# Patient Record
Sex: FEMALE | Race: ASIAN | NOT HISPANIC OR LATINO | URBAN - METROPOLITAN AREA
[De-identification: names, ages, dates, MRNs, and addresses within clinical notes are randomized per-mention and may not be internally consistent; named-entity substitution may affect disease eponyms.]

---

## 2018-01-18 ENCOUNTER — OFFICE VISIT (OUTPATIENT)
Dept: FAMILY MEDICINE CLINIC | Facility: CLINIC | Age: 38
End: 2018-01-18

## 2018-01-18 VITALS
DIASTOLIC BLOOD PRESSURE: 70 MMHG | BODY MASS INDEX: 26.8 KG/M2 | WEIGHT: 157 LBS | HEART RATE: 92 BPM | SYSTOLIC BLOOD PRESSURE: 98 MMHG | OXYGEN SATURATION: 99 % | HEIGHT: 64 IN

## 2018-01-18 DIAGNOSIS — R53.1 WEAKNESS: ICD-10-CM

## 2018-01-18 DIAGNOSIS — Z00.00 PHYSICAL EXAM: Primary | ICD-10-CM

## 2018-01-18 DIAGNOSIS — I49.9 IRREGULAR HEART RATE: ICD-10-CM

## 2018-01-18 LAB
ALBUMIN SERPL-MCNC: 4.3 G/DL (ref 3.5–5.2)
ALBUMIN/GLOB SERPL: 1.4 G/DL
ALP SERPL-CCNC: 39 U/L (ref 39–117)
ALT SERPL-CCNC: 16 U/L (ref 1–33)
AST SERPL-CCNC: 18 U/L (ref 1–32)
BASOPHILS # BLD AUTO: 0.02 10*3/MM3 (ref 0–0.2)
BASOPHILS NFR BLD AUTO: 0.3 % (ref 0–1.5)
BILIRUB SERPL-MCNC: 0.5 MG/DL (ref 0.1–1.2)
BUN SERPL-MCNC: 10 MG/DL (ref 6–20)
BUN/CREAT SERPL: 15.6 (ref 7–25)
CALCIUM SERPL-MCNC: 9.5 MG/DL (ref 8.6–10.5)
CHLORIDE SERPL-SCNC: 98 MMOL/L (ref 98–107)
CHOLEST SERPL-MCNC: 132 MG/DL (ref 0–200)
CHOLEST/HDLC SERPL: 2.44 {RATIO}
CK SERPL-CCNC: 51 U/L (ref 20–180)
CO2 SERPL-SCNC: 22.2 MMOL/L (ref 22–29)
CREAT SERPL-MCNC: 0.64 MG/DL (ref 0.57–1)
EOSINOPHIL # BLD AUTO: 0.38 10*3/MM3 (ref 0–0.7)
EOSINOPHIL NFR BLD AUTO: 5.9 % (ref 0.3–6.2)
ERYTHROCYTE [DISTWIDTH] IN BLOOD BY AUTOMATED COUNT: 12.7 % (ref 11.7–13)
FERRITIN SERPL-MCNC: 118.8 NG/ML (ref 13–150)
FOLATE SERPL-MCNC: 17.59 NG/ML (ref 4.78–24.2)
GLOBULIN SER CALC-MCNC: 3.1 GM/DL
GLUCOSE SERPL-MCNC: 96 MG/DL (ref 65–99)
HCT VFR BLD AUTO: 39.5 % (ref 35.6–45.5)
HDLC SERPL-MCNC: 54 MG/DL (ref 40–60)
HGB BLD-MCNC: 12.9 G/DL (ref 11.9–15.5)
IMM GRANULOCYTES # BLD: 0 10*3/MM3 (ref 0–0.03)
IMM GRANULOCYTES NFR BLD: 0 % (ref 0–0.5)
IRON SERPL-MCNC: 140 MCG/DL (ref 37–145)
LDLC SERPL CALC-MCNC: 68 MG/DL (ref 0–100)
LYMPHOCYTES # BLD AUTO: 1.7 10*3/MM3 (ref 0.9–4.8)
LYMPHOCYTES NFR BLD AUTO: 26.4 % (ref 19.6–45.3)
MCH RBC QN AUTO: 29.3 PG (ref 26.9–32)
MCHC RBC AUTO-ENTMCNC: 32.7 G/DL (ref 32.4–36.3)
MCV RBC AUTO: 89.8 FL (ref 80.5–98.2)
MONOCYTES # BLD AUTO: 0.4 10*3/MM3 (ref 0.2–1.2)
MONOCYTES NFR BLD AUTO: 6.2 % (ref 5–12)
NEUTROPHILS # BLD AUTO: 3.93 10*3/MM3 (ref 1.9–8.1)
NEUTROPHILS NFR BLD AUTO: 61.2 % (ref 42.7–76)
PLATELET # BLD AUTO: 237 10*3/MM3 (ref 140–500)
POTASSIUM SERPL-SCNC: 4 MMOL/L (ref 3.5–5.2)
PROT SERPL-MCNC: 7.4 G/DL (ref 6–8.5)
RBC # BLD AUTO: 4.4 10*6/MM3 (ref 3.9–5.2)
SODIUM SERPL-SCNC: 135 MMOL/L (ref 136–145)
TRIGL SERPL-MCNC: 52 MG/DL (ref 0–150)
VIT B12 SERPL-MCNC: 747 PG/ML (ref 211–946)
VLDLC SERPL CALC-MCNC: 10.4 MG/DL (ref 5–40)
WBC # BLD AUTO: 6.43 10*3/MM3 (ref 4.5–10.7)

## 2018-01-18 PROCEDURE — 93000 ELECTROCARDIOGRAM COMPLETE: CPT | Performed by: NURSE PRACTITIONER

## 2018-01-18 PROCEDURE — 99202 OFFICE O/P NEW SF 15 MIN: CPT | Performed by: NURSE PRACTITIONER

## 2018-01-18 PROCEDURE — 99385 PREV VISIT NEW AGE 18-39: CPT | Performed by: NURSE PRACTITIONER

## 2018-01-18 NOTE — PATIENT INSTRUCTIONS
Preventive Care 18-39 Years, Female  Preventive care refers to lifestyle choices and visits with your health care provider that can promote health and wellness.  What does preventive care include?  · A yearly physical exam. This is also called an annual well check.  · Dental exams once or twice a year.  · Routine eye exams. Ask your health care provider how often you should have your eyes checked.  · Personal lifestyle choices, including:  ¨ Daily care of your teeth and gums.  ¨ Regular physical activity.  ¨ Eating a healthy diet.  ¨ Avoiding tobacco and drug use.  ¨ Limiting alcohol use.  ¨ Practicing safe sex.  ¨ Taking vitamin and mineral supplements as recommended by your health care provider.  What happens during an annual well check?  The services and screenings done by your health care provider during your annual well check will depend on your age, overall health, lifestyle risk factors, and family history of disease.  Counseling   Your health care provider may ask you questions about your:  · Alcohol use.  · Tobacco use.  · Drug use.  · Emotional well-being.  · Home and relationship well-being.  · Sexual activity.  · Eating habits.  · Work and work environment.  · Method of birth control.  · Menstrual cycle.  · Pregnancy history.  Screening   You may have the following tests or measurements:  · Height, weight, and BMI.  · Diabetes screening. This is done by checking your blood sugar (glucose) after you have not eaten for a while (fasting).  · Blood pressure.  · Lipid and cholesterol levels. These may be checked every 5 years starting at age 20.  · Skin check.  · Hepatitis C blood test.  · Hepatitis B blood test.  · Sexually transmitted disease (STD) testing.  · BRCA-related cancer screening. This may be done if you have a family history of breast, ovarian, tubal, or peritoneal cancers.  · Pelvic exam and Pap test. This may be done every 3 years starting at age 21. Starting at age 30, this may be done every 5  years if you have a Pap test in combination with an HPV test.  Discuss your test results, treatment options, and if necessary, the need for more tests with your health care provider.  Vaccines   Your health care provider may recommend certain vaccines, such as:  · Influenza vaccine. This is recommended every year.  · Tetanus, diphtheria, and acellular pertussis (Tdap, Td) vaccine. You may need a Td booster every 10 years.  · Varicella vaccine. You may need this if you have not been vaccinated.  · HPV vaccine. If you are 26 or younger, you may need three doses over 6 months.  · Measles, mumps, and rubella (MMR) vaccine. You may need at least one dose of MMR. You may also need a second dose.  · Pneumococcal 13-valent conjugate (PCV13) vaccine. You may need this if you have certain conditions and were not previously vaccinated.  · Pneumococcal polysaccharide (PPSV23) vaccine. You may need one or two doses if you smoke cigarettes or if you have certain conditions.  · Meningococcal vaccine. One dose is recommended if you are age 19-21 years and a first-year college student living in a residence espinosa, or if you have one of several medical conditions. You may also need additional booster doses.  · Hepatitis A vaccine. You may need this if you have certain conditions or if you travel or work in places where you may be exposed to hepatitis A.  · Hepatitis B vaccine. You may need this if you have certain conditions or if you travel or work in places where you may be exposed to hepatitis B.  · Haemophilus influenzae type b (Hib) vaccine. You may need this if you have certain risk factors.  Talk to your health care provider about which screenings and vaccines you need and how often you need them.  This information is not intended to replace advice given to you by your health care provider. Make sure you discuss any questions you have with your health care provider.  Document Released: 02/13/2003 Document Revised: 09/06/2017  Document Reviewed: 10/18/2016  [employer] Interactive Patient Education © 2017 [employer]

## 2018-01-18 NOTE — PROGRESS NOTES
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 She states that on 12/26/17 and 1/2/18 she had weakness. From 12/26/17-1/2/18 she had rapid irregular heart beat and chest discomfort. Was not short of breath.Did not have any orthopnea.    PMHX:None  PFHX:HTN both parents  Diabetes both parents, live in [place]  Not  no children  Vegan diet  Has started to exercise since her incident with her heart.       Assessment/Plan   Problem List Items Addressed This Visit     None      Visit Diagnoses     Physical exam    -  Primary    Relevant Orders    Comprehensive Metabolic Panel    Lipid Panel With / Chol / HDL Ratio    Irregular heart rate        Relevant Orders    ECG 12 Lead    Troponin I    CK    Weakness        Relevant Orders    Iron    Vitamin B12 and Folate    Ferritin    CBC and Differential             No Follow-up on file.  There are no Patient Instructions on file for this visit.    Chief Complaint   Patient presents with   • Annual Exam   • Establish Care   • Rapid Heart Rate     Social History   Substance Use Topics   • Smoking status: Never Smoker   • Smokeless tobacco: Never Used   • Alcohol use No       History of Present Illness     The following portions of the patient's history were reviewed and updated as appropriate:PMHroutine: Social history , Past Medical History, Surgical history , Allergies, Current Medications, Active Problem List, Family History and Health Maintenance    Review of Systems   Constitutional: Positive for fatigue. Negative for activity change and appetite change.   Respiratory: Negative for cough, chest tightness and shortness of breath.    Cardiovascular: Negative for chest pain.       Objective   Vitals:    01/18/18 0812   BP: 98/70   Pulse: 92   SpO2: 99%   Weight: 71.2 kg (157 lb)   Height: 162.6 cm (64\")     Body mass index is 26.95 kg/(m^2).  Physical Exam   Constitutional: She is oriented to person, place, and time. She appears well-developed and " well-nourished. No distress.   HENT:   Head: Normocephalic.   Right Ear: External ear normal.   Left Ear: External ear normal.   Eyes: EOM are normal. Pupils are equal, round, and reactive to light.   Cardiovascular: Normal rate and regular rhythm.    Pulmonary/Chest: Effort normal.   Abdominal: Soft. Bowel sounds are normal.   Musculoskeletal: Normal range of motion.   Neurological: She is alert and oriented to person, place, and time.   Skin: Skin is warm.   Nursing note and vitals reviewed.      Reviewed Data:  No results found for any previous visit.      ECG 12 Lead  Date/Time: 1/18/2018 10:10 AM  Performed by: SHERLYN SMITH  Authorized by: SHERLYN SMITH   Comparison: not compared with previous ECG   Rhythm: sinus rhythm  Rate: normal  QRS axis: normal          left atrial enlargement  Rt ventricular conduction delay  Will refer to cardiology for evaluation

## 2018-01-19 LAB — TROPONIN I SERPL-MCNC: <0.01 NG/ML (ref 0–0.04)

## 2018-05-01 ENCOUNTER — OFFICE VISIT (OUTPATIENT)
Dept: CARDIOLOGY | Facility: CLINIC | Age: 38
End: 2018-05-01

## 2018-05-01 VITALS
SYSTOLIC BLOOD PRESSURE: 120 MMHG | WEIGHT: 150 LBS | DIASTOLIC BLOOD PRESSURE: 78 MMHG | HEART RATE: 101 BPM | HEIGHT: 64 IN | BODY MASS INDEX: 25.61 KG/M2

## 2018-05-01 DIAGNOSIS — R00.2 PALPITATIONS: Primary | ICD-10-CM

## 2018-05-01 DIAGNOSIS — R06.02 SHORTNESS OF BREATH: ICD-10-CM

## 2018-05-01 PROCEDURE — 93000 ELECTROCARDIOGRAM COMPLETE: CPT | Performed by: INTERNAL MEDICINE

## 2018-05-01 PROCEDURE — 99203 OFFICE O/P NEW LOW 30 MIN: CPT | Performed by: INTERNAL MEDICINE

## 2018-05-01 NOTE — PROGRESS NOTES
Date of Office Visit: 2018  Encounter Provider: Almita Graham MD  Place of Service: [facility] CARDIOLOGY  Patient Name: Gabriela Be  :1980      Patient ID:  Gabriela Be is a 37 y.o. female is here for dyspnea, palpitations.           History of Present Illness    Miss Ibarra is a 37-year-old female whose mother lives with her.  She is coming in with palpitations.  This started around Maeve time .  Been intermittent.  She's had mild dizziness associated with them but no dyspnea or syncope.  She has occasional dyspnea but only with heavy exertion.  Sometimes when she's sitting still, she feels like she is to take a deep breath.  She's had no tachycardia.  She denies exertional chest tightness or pressure.  Sometimes she gets chest pain with stress.  Comment.  She has no history of diabetes mellitus or hypertension.  She had laboratory values done 2018 showing LDL 68, HR 54 normal CMP and CBC.    She's never had myocardial infarction, stroke, rheumatic fever, heart failure, sleep apnea, blood clots, cancer, renal disease, asthma, emphysema.    She's had no recent travel no change medications.  When she gets a pounding, she does get brain fog and weakness.  She has no history of hypotension.    She is single and has no children.  She works as a  at [employer] 40 hours per week.  She has rare alcohol and no cigarettes.  She has 2 cups of coffee per day.  Her mother is 71 has anginal chest pain.      Past Medical History:   Diagnosis Date   • Irregular heartbeat    • Weakness          Past Surgical History:   Procedure Laterality Date   • HEMORRHOIDECTOMY         Current Outpatient Prescriptions on File Prior to Visit   Medication Sig Dispense Refill   • Magnesium Gluconate (MAGNESIUM 27 PO) Take  by mouth.     • NON FORMULARY nootropic     • Omega-3 Fatty Acids (OMEGA 3 PO) Take  by mouth.     • Potassium (POTASSIMIN PO) Take  by mouth.    "    No current facility-administered medications on file prior to visit.        Social History     Social History   • Marital status: Single     Spouse name: N/A   • Number of children: N/A   • Years of education: N/A     Occupational History   • Not on file.     Social History Main Topics   • Smoking status: Never Smoker   • Smokeless tobacco: Never Used      Comment: CAFFEINE USE   • Alcohol use Yes      Comment: RARE   • Drug use: No   • Sexual activity: Not on file     Other Topics Concern   • Not on file     Social History Narrative   • No narrative on file           Review of Systems   Constitution: Negative.   HENT: Negative for congestion.    Eyes: Negative for vision loss in left eye and vision loss in right eye.   Respiratory: Negative.  Negative for cough, hemoptysis, shortness of breath, sleep disturbances due to breathing, snoring, sputum production and wheezing.    Endocrine: Negative.    Hematologic/Lymphatic: Negative.    Skin: Negative for poor wound healing and rash.   Musculoskeletal: Negative for falls, gout, muscle cramps and myalgias.   Gastrointestinal: Negative for abdominal pain, diarrhea, dysphagia, hematemesis, melena, nausea and vomiting.   Neurological: Negative for excessive daytime sleepiness, dizziness, headaches, light-headedness, loss of balance, seizures and vertigo.   Psychiatric/Behavioral: Negative for depression and substance abuse. The patient is not nervous/anxious.        Procedures    ECG 12 Lead  Date/Time: 5/1/2018 12:57 PM  Performed by: LAYLA HANNAH  Authorized by: LAYLA HANNAH   Comparison: compared with previous ECG   Similar to previous ECG  Rhythm: sinus rhythm  Clinical impression: normal ECG                Objective:      Vitals:    05/01/18 1227 05/01/18 1229   BP: 120/80 120/78   BP Location: Right arm Left arm   Patient Position: Sitting Sitting   Pulse: 101    Weight: 68 kg (150 lb)    Height: 162.6 cm (64\")      Body mass index is 25.75 " kg/m².    Physical Exam   Constitutional: She is oriented to person, place, and time. She appears well-developed and well-nourished. No distress.   HENT:   Head: Normocephalic and atraumatic.   Eyes: Conjunctivae are normal. No scleral icterus.   Neck: Neck supple. No JVD present. Carotid bruit is not present. No thyromegaly present.   Cardiovascular: Normal rate, regular rhythm, S1 normal, S2 normal, normal heart sounds and intact distal pulses.   No extrasystoles are present. PMI is not displaced.  Exam reveals no gallop.    No murmur heard.  Pulses:       Carotid pulses are 2+ on the right side, and 2+ on the left side.       Radial pulses are 2+ on the right side, and 2+ on the left side.        Dorsalis pedis pulses are 2+ on the right side, and 2+ on the left side.        Posterior tibial pulses are 2+ on the right side, and 2+ on the left side.   Pulmonary/Chest: Effort normal and breath sounds normal. No respiratory distress. She has no wheezes. She has no rhonchi. She has no rales. She exhibits no tenderness.   Abdominal: Soft. Bowel sounds are normal. She exhibits no distension, no abdominal bruit and no mass. There is no tenderness.   Musculoskeletal: She exhibits no edema or deformity.   Lymphadenopathy:     She has no cervical adenopathy.   Neurological: She is alert and oriented to person, place, and time. No cranial nerve deficit.   Skin: Skin is warm and dry. No rash noted. She is not diaphoretic. No cyanosis. No pallor. Nails show no clubbing.   Psychiatric: She has a normal mood and affect. Judgment normal.   Vitals reviewed.      Lab Review:       Assessment:      Diagnosis Plan   1. Palpitations  TSH    Holter Monitor - 24 Hour   2. Shortness of breath       1. Palpitations, set up holter and check TSH.  No med changes.      Plan:       F/u with depend on holter.

## 2018-05-03 ENCOUNTER — TELEPHONE (OUTPATIENT)
Dept: CARDIOLOGY | Facility: CLINIC | Age: 38
End: 2018-05-03

## 2018-05-03 NOTE — TELEPHONE ENCOUNTER
----- Message from Almita Graham MD sent at 5/3/2018  2:03 PM EDT -----  pls call and let her know that her monitor is normal  - no f/u needed unless has more palpitations.

## 2018-11-28 ENCOUNTER — OFFICE VISIT (OUTPATIENT)
Dept: FAMILY MEDICINE CLINIC | Facility: CLINIC | Age: 38
End: 2018-11-28

## 2018-11-28 VITALS
BODY MASS INDEX: 28.68 KG/M2 | HEIGHT: 64 IN | HEART RATE: 88 BPM | WEIGHT: 168 LBS | SYSTOLIC BLOOD PRESSURE: 100 MMHG | OXYGEN SATURATION: 100 % | DIASTOLIC BLOOD PRESSURE: 70 MMHG

## 2018-11-28 DIAGNOSIS — R00.2 INTERMITTENT PALPITATIONS: Primary | ICD-10-CM

## 2018-11-28 PROCEDURE — 99213 OFFICE O/P EST LOW 20 MIN: CPT | Performed by: NURSE PRACTITIONER

## 2018-11-28 NOTE — PROGRESS NOTES
"Gabriela Be is a 38 y.o. female.Gabriela states that for the last 2 months she has had a thump in her chest followed by a cough. She was seen by a cardiologist early this year and they wanted her to have thyroid testing.She got lost going to the lab and never had the test done  Reports she feels like her heart beat in her stomach.     Is a     Assessment/Plan   Problem List Items Addressed This Visit     None      Visit Diagnoses     Intermittent palpitations    -  Primary    Relevant Orders    TSH             No Follow-up on file.  There are no Patient Instructions on file for this visit.    Chief Complaint   Patient presents with   • Cough     Social History     Tobacco Use   • Smoking status: Never Smoker   • Smokeless tobacco: Never Used   • Tobacco comment: CAFFEINE USE   Substance Use Topics   • Alcohol use: Yes     Comment: RARE   • Drug use: No       History of Present Illness     The following portions of the patient's history were reviewed and updated as appropriate:PMHroutine: Social history , Allergies, Current Medications, Active Problem List and Health Maintenance    Review of Systems   Constitutional: Negative for activity change.   Cardiovascular: Negative for chest pain and palpitations.   Gastrointestinal: Negative for abdominal distention and abdominal pain.       Objective   Vitals:    11/28/18 0837   BP: 100/70   Pulse: 88   SpO2: 100%   Weight: 76.2 kg (168 lb)   Height: 162.6 cm (64\")     Body mass index is 28.84 kg/m².  Physical Exam   Constitutional: She appears well-developed and well-nourished. No distress.   HENT:   Head: Normocephalic and atraumatic.   Eyes: EOM are normal.   Neck: Neck supple. No thyromegaly present.   Cardiovascular: Normal rate, regular rhythm and normal heart sounds.   Pulmonary/Chest: Effort normal and breath sounds normal.   Musculoskeletal: Normal range of motion.   Neurological: She is alert.   Skin: Skin is warm.   Nursing note and vitals " reviewed.    Reviewed Data:  No visits with results within 1 Month(s) from this visit.   Latest known visit with results is:   Office Visit on 01/18/2018   Component Date Value Ref Range Status   • Glucose 01/18/2018 96  65 - 99 mg/dL Final   • BUN 01/18/2018 10  6 - 20 mg/dL Final   • Creatinine 01/18/2018 0.64  0.57 - 1.00 mg/dL Final   • eGFR Non  Am 01/18/2018 103  >60 mL/min/1.73 Final   • eGFR African Am 01/18/2018 125  >60 mL/min/1.73 Final   • BUN/Creatinine Ratio 01/18/2018 15.6  7.0 - 25.0 Final   • Sodium 01/18/2018 135* 136 - 145 mmol/L Final   • Potassium 01/18/2018 4.0  3.5 - 5.2 mmol/L Final   • Chloride 01/18/2018 98  98 - 107 mmol/L Final   • Total CO2 01/18/2018 22.2  22.0 - 29.0 mmol/L Final   • Calcium 01/18/2018 9.5  8.6 - 10.5 mg/dL Final   • Total Protein 01/18/2018 7.4  6.0 - 8.5 g/dL Final   • Albumin 01/18/2018 4.30  3.50 - 5.20 g/dL Final   • Globulin 01/18/2018 3.1  gm/dL Final   • A/G Ratio 01/18/2018 1.4  g/dL Final   • Total Bilirubin 01/18/2018 0.5  0.1 - 1.2 mg/dL Final   • Alkaline Phosphatase 01/18/2018 39  39 - 117 U/L Final   • AST (SGOT) 01/18/2018 18  1 - 32 U/L Final   • ALT (SGPT) 01/18/2018 16  1 - 33 U/L Final   • Total Cholesterol 01/18/2018 132  0 - 200 mg/dL Final   • Triglycerides 01/18/2018 52  0 - 150 mg/dL Final   • HDL Cholesterol 01/18/2018 54  40 - 60 mg/dL Final   • VLDL Cholesterol 01/18/2018 10.4  5 - 40 mg/dL Final   • LDL Cholesterol  01/18/2018 68  0 - 100 mg/dL Final   • Chol/HDL Ratio 01/18/2018 2.44   Final   • Troponin I 01/18/2018 <0.01  0.00 - 0.04 ng/mL Final   • Creatine Kinase 01/18/2018 51  20 - 180 U/L Final   • WBC 01/18/2018 6.43  4.50 - 10.70 10*3/mm3 Final   • RBC 01/18/2018 4.40  3.90 - 5.20 10*6/mm3 Final   • Hemoglobin 01/18/2018 12.9  11.9 - 15.5 g/dL Final   • Hematocrit 01/18/2018 39.5  35.6 - 45.5 % Final   • MCV 01/18/2018 89.8  80.5 - 98.2 fL Final   • MCH 01/18/2018 29.3  26.9 - 32.0 pg Final   • MCHC 01/18/2018 32.7  32.4 -  36.3 g/dL Final   • RDW 01/18/2018 12.7  11.7 - 13.0 % Final   • Platelets 01/18/2018 237  140 - 500 10*3/mm3 Final   • Neutrophil Rel % 01/18/2018 61.2  42.7 - 76.0 % Final   • Lymphocyte Rel % 01/18/2018 26.4  19.6 - 45.3 % Final   • Monocyte Rel % 01/18/2018 6.2  5.0 - 12.0 % Final   • Eosinophil Rel % 01/18/2018 5.9  0.3 - 6.2 % Final   • Basophil Rel % 01/18/2018 0.3  0.0 - 1.5 % Final   • Neutrophils Absolute 01/18/2018 3.93  1.90 - 8.10 10*3/mm3 Final   • Lymphocytes Absolute 01/18/2018 1.70  0.90 - 4.80 10*3/mm3 Final   • Monocytes Absolute 01/18/2018 0.40  0.20 - 1.20 10*3/mm3 Final   • Eosinophils Absolute 01/18/2018 0.38  0.00 - 0.70 10*3/mm3 Final   • Basophils Absolute 01/18/2018 0.02  0.00 - 0.20 10*3/mm3 Final   • Immature Granulocyte Rel % 01/18/2018 0.0  0.0 - 0.5 % Final   • Immature Grans Absolute 01/18/2018 0.00  0.00 - 0.03 10*3/mm3 Final   • Vitamin B-12 01/18/2018 747  211 - 946 pg/mL Final   • Folate 01/18/2018 17.59  4.78 - 24.20 ng/mL Final   • Iron 01/18/2018 140  37 - 145 mcg/dL Final   • Ferritin 01/18/2018 118.80  13.00 - 150.00 ng/mL Final

## 2018-11-29 LAB — TSH SERPL DL<=0.005 MIU/L-ACNC: 2.38 MIU/ML (ref 0.27–4.2)

## 2020-02-18 ENCOUNTER — TELEPHONE (OUTPATIENT)
Dept: FAMILY MEDICINE CLINIC | Facility: CLINIC | Age: 40
End: 2020-02-18

## 2020-02-18 NOTE — TELEPHONE ENCOUNTER
PT said that she had her labs done at [facility] and her thyroid was low. She wants to know if she should come in and be seen in the office or if she needs to see a specialist?

## 2020-02-19 NOTE — TELEPHONE ENCOUNTER
Yes I would like her to come in in I cannot find her results anywhere she could bring them with her we could try to find one that would be great thank you

## 2020-02-27 ENCOUNTER — OFFICE VISIT (OUTPATIENT)
Dept: FAMILY MEDICINE CLINIC | Facility: CLINIC | Age: 40
End: 2020-02-27

## 2020-02-27 VITALS
WEIGHT: 168 LBS | HEART RATE: 94 BPM | HEIGHT: 64 IN | DIASTOLIC BLOOD PRESSURE: 84 MMHG | BODY MASS INDEX: 28.68 KG/M2 | RESPIRATION RATE: 16 BRPM | SYSTOLIC BLOOD PRESSURE: 138 MMHG | TEMPERATURE: 97.7 F | OXYGEN SATURATION: 98 %

## 2020-02-27 DIAGNOSIS — R53.83 FATIGUE, UNSPECIFIED TYPE: ICD-10-CM

## 2020-02-27 LAB
ALBUMIN SERPL-MCNC: 4.3 G/DL (ref 3.5–5.2)
ALBUMIN/GLOB SERPL: 1.4 G/DL
ALP SERPL-CCNC: 94 U/L (ref 39–117)
ALT SERPL-CCNC: 39 U/L (ref 1–33)
AST SERPL-CCNC: 20 U/L (ref 1–32)
BILIRUB SERPL-MCNC: 0.3 MG/DL (ref 0.2–1.2)
BUN SERPL-MCNC: 13 MG/DL (ref 6–20)
BUN/CREAT SERPL: 25 (ref 7–25)
CALCIUM SERPL-MCNC: 9.8 MG/DL (ref 8.6–10.5)
CHLORIDE SERPL-SCNC: 101 MMOL/L (ref 98–107)
CO2 SERPL-SCNC: 23 MMOL/L (ref 22–29)
CREAT SERPL-MCNC: 0.52 MG/DL (ref 0.57–1)
ERYTHROCYTE [DISTWIDTH] IN BLOOD BY AUTOMATED COUNT: 12 % (ref 12.3–15.4)
GLOBULIN SER CALC-MCNC: 3 GM/DL
GLUCOSE SERPL-MCNC: 120 MG/DL (ref 65–99)
HCT VFR BLD AUTO: 40.1 % (ref 34–46.6)
HGB BLD-MCNC: 13.3 G/DL (ref 12–15.9)
MCH RBC QN AUTO: 27.8 PG (ref 26.6–33)
MCHC RBC AUTO-ENTMCNC: 33.2 G/DL (ref 31.5–35.7)
MCV RBC AUTO: 83.9 FL (ref 79–97)
PLATELET # BLD AUTO: 283 10*3/MM3 (ref 140–450)
POTASSIUM SERPL-SCNC: 4.6 MMOL/L (ref 3.5–5.2)
PROT SERPL-MCNC: 7.3 G/DL (ref 6–8.5)
RBC # BLD AUTO: 4.78 10*6/MM3 (ref 3.77–5.28)
SODIUM SERPL-SCNC: 136 MMOL/L (ref 136–145)
T4 FREE SERPL-MCNC: 3.27 NG/DL (ref 0.93–1.7)
TSH SERPL DL<=0.005 MIU/L-ACNC: <0.005 UIU/ML (ref 0.27–4.2)
WBC # BLD AUTO: 7.54 10*3/MM3 (ref 3.4–10.8)

## 2020-02-27 PROCEDURE — 99213 OFFICE O/P EST LOW 20 MIN: CPT | Performed by: NURSE PRACTITIONER

## 2020-02-27 NOTE — PATIENT INSTRUCTIONS
Hypothyroidism    Hypothyroidism is when the thyroid gland does not make enough of certain hormones (it is underactive). The thyroid gland is a small gland located in the lower front part of the neck, just in front of the windpipe (trachea). This gland makes hormones that help control how the body uses food for energy (metabolism) as well as how the heart and brain function. These hormones also play a role in keeping your bones strong. When the thyroid is underactive, it produces too little of the hormones thyroxine (T4) and triiodothyronine (T3).  What are the causes?  This condition may be caused by:  · Hashimoto's disease. This is a disease in which the body's disease-fighting system (immune system) attacks the thyroid gland. This is the most common cause.  · Viral infections.  · Pregnancy.  · Certain medicines.  · Birth defects.  · Past radiation treatments to the head or neck for cancer.  · Past treatment with radioactive iodine.  · Past exposure to radiation in the environment.  · Past surgical removal of part or all of the thyroid.  · Problems with a gland in the center of the brain (pituitary gland).  · Lack of enough iodine in the diet.  What increases the risk?  You are more likely to develop this condition if:  · You are female.  · You have a family history of thyroid conditions.  · You use a medicine called lithium.  · You take medicines that affect the immune system (immunosuppressants).  What are the signs or symptoms?  Symptoms of this condition include:  · Feeling as though you have no energy (lethargy).  · Not being able to tolerate cold.  · Weight gain that is not explained by a change in diet or exercise habits.  · Lack of appetite.  · Dry skin.  · Coarse hair.  · Menstrual irregularity.  · Slowing of thought processes.  · Constipation.  · Sadness or depression.  How is this diagnosed?  This condition may be diagnosed based on:  · Your symptoms, your medical history, and a physical exam.  · Blood  tests.  You may also have imaging tests, such as an ultrasound or MRI.  How is this treated?  This condition is treated with medicine that replaces the thyroid hormones that your body does not make. After you begin treatment, it may take several weeks for symptoms to go away.  Follow these instructions at home:  · Take over-the-counter and prescription medicines only as told by your health care provider.  · If you start taking any new medicines, tell your health care provider.  · Keep all follow-up visits as told by your health care provider. This is important.  ? As your condition improves, your dosage of thyroid hormone medicine may change.  ? You will need to have blood tests regularly so that your health care provider can monitor your condition.  Contact a health care provider if:  · Your symptoms do not get better with treatment.  · You are taking thyroid replacement medicine and you:  ? Sweat a lot.  ? Have tremors.  ? Feel anxious.  ? Lose weight rapidly.  ? Cannot tolerate heat.  ? Have emotional swings.  ? Have diarrhea.  ? Feel weak.  Get help right away if you have:  · Chest pain.  · An irregular heartbeat.  · A rapid heartbeat.  · Difficulty breathing.  Summary  · Hypothyroidism is when the thyroid gland does not make enough of certain hormones (it is underactive).  · When the thyroid is underactive, it produces too little of the hormones thyroxine (T4) and triiodothyronine (T3).  · The most common cause is Hashimoto's disease, a disease in which the body's disease-fighting system (immune system) attacks the thyroid gland. The condition can also be caused by viral infections, medicine, pregnancy, or past radiation treatment to the head or neck.  · Symptoms may include weight gain, dry skin, constipation, feeling as though you do not have energy, and not being able to tolerate cold.  · This condition is treated with medicine to replace the thyroid hormones that your body does not make.  This information  is not intended to replace advice given to you by your health care provider. Make sure you discuss any questions you have with your health care provider.  Document Released: 12/18/2006 Document Revised: 11/28/2018 Document Reviewed: 11/28/2018  [employer] Interactive Patient Education © 2020 [employer]

## 2020-02-27 NOTE — PROGRESS NOTES
Subjective abnormal lab result  Gabriela Be is a 39 y.o. female.     History of Present Illness   Gabriela is here for tsh lab work. She complaints of being fatigue and having palpitations.   She went to the [facility] and had her TSH drawn and it was high. She would like to have more labwork.    The following portions of the patient's history were reviewed and updated as appropriate: allergies, current medications, past family history, past medical history, past social history, past surgical history and problem list.    Review of Systems   Constitutional: Positive for fatigue and unexpected weight gain.   Cardiovascular: Positive for palpitations.   Neurological: Positive for weakness.       Objective   Physical Exam   Constitutional: She is oriented to person, place, and time. She appears well-developed and well-nourished.   HENT:   Head: Normocephalic and atraumatic.   Mouth/Throat: Oropharynx is clear and moist.   Eyes: Pupils are equal, round, and reactive to light. Conjunctivae and EOM are normal.   Neck: Neck supple.   Cardiovascular: Normal rate and regular rhythm.   Pulmonary/Chest: Effort normal and breath sounds normal. No respiratory distress.   Abdominal: Bowel sounds are normal.   Musculoskeletal: Normal range of motion.   Lymphadenopathy:     She has no cervical adenopathy.   Neurological: She is alert and oriented to person, place, and time.   Skin: Skin is warm and dry.   Psychiatric: She has a normal mood and affect.   Nursing note and vitals reviewed.        Assessment/Plan   Problem List Items Addressed This Visit     None      Visit Diagnoses     Abnormal labor    -  Primary    Relevant Orders    TSH    T4, free    T3, free    Fatigue, unspecified type        Relevant Orders    Comprehensive Metabolic Panel    TSH    T4, free    T3, free    CBC (No Diff)               Return if symptoms worsen or fail to improve.

## 2020-02-28 LAB — T3FREE SERPL-MCNC: 9.5 PG/ML (ref 2–4.4)

## 2020-03-02 DIAGNOSIS — E05.90 HYPERTHYROIDISM: Primary | ICD-10-CM

## 2020-03-03 ENCOUNTER — TELEPHONE (OUTPATIENT)
Dept: FAMILY MEDICINE CLINIC | Facility: CLINIC | Age: 40
End: 2020-03-03

## 2020-03-03 NOTE — TELEPHONE ENCOUNTER
PT REQUESTED TO GET THE BILLING CODE FOR THE THYROID ULTRASOUND SHE IS SUPPOSED TO HAVE DONE. PT STATED THAT SHOULD LIKE TO HAVE TO CODE SO SHE CAN CALL HER INSURANCE TO GET AN ESTIMATE.    PLEASE ADVISE [phone]

## 2020-03-16 ENCOUNTER — HOSPITAL ENCOUNTER (OUTPATIENT)
Dept: ULTRASOUND IMAGING | Facility: HOSPITAL | Age: 40
Discharge: HOME | End: 2020-03-16
Admitting: NURSE PRACTITIONER

## 2020-03-16 PROCEDURE — 76536 US EXAM OF HEAD AND NECK: CPT

## 2020-04-30 ENCOUNTER — TELEPHONE (OUTPATIENT)
Dept: ENDOCRINOLOGY | Age: 40
End: 2020-04-30

## 2020-04-30 NOTE — TELEPHONE ENCOUNTER
Left voice mail for patient to call the office back in regards to patient  Office visit with Dr Murcia due to covid 19 we are not seeing pt in office but are offering  A video or telephone visit with patient

## 2020-05-01 ENCOUNTER — OFFICE VISIT (OUTPATIENT)
Dept: ENDOCRINOLOGY | Age: 40
End: 2020-05-01

## 2020-05-01 DIAGNOSIS — E05.90 HYPERTHYROIDISM: Primary | ICD-10-CM

## 2020-05-01 PROCEDURE — 99204 OFFICE O/P NEW MOD 45 MIN: CPT | Performed by: INTERNAL MEDICINE

## 2020-05-01 NOTE — PROGRESS NOTES
Chief Complaint   Patient presents with   • Hyperthyroidism       Gabriela Be 39 y.o.  female presents as a new patient for the evaluation of Hyperthyroidism. Consulted by Miss. Durand.   This is a video visit during the covid pandemic    Patient reports that in January 2020 she has been experiencing palpitations, anxiety and hair loss which prompted her to get her blood work-up done at a [facility] and when her thyroid levels were abnormal she approached her primary care.  Primary care repeated the blood work-up in February 2020 which showed low TSH levels and slightly elevated free T4 levels.    Patient also had an ultrasound of her thyroid in March 2020 which showed features consistent with thyroiditis and no nodules noted.    Pt reports having symptoms for about 2 to 3 month(s).  She complains of feeling tired, no c/o constipation, some hair loss and increased sweating, some dry skin , sleep is ok.  Some c/o heat intolerance and no cold intolerance.  Improving c/o tremors, racing of heart, c/o anxiety at times and no eye symptoms.   Denied c/o difficulty breathing, swallowing and change in voice.   No family hx of thyroid disease.     Menstrual cycles are regular, did have a miscarriage, does not have any life kids.    You have chosen to receive care through a telehealth visit.  Do you consent to use a video/audio connection for your medical care today? Yes      Reviewed primary care physician's/consulting physician documentation and lab results :     I have reviewed the patient's allergies, medicines, past medical hx, family hx and social hx in detail.    Past Medical History:   Diagnosis Date   • Irregular heartbeat    • Weakness        Family History   Problem Relation Age of Onset   • Hypertension Mother    • Heart disease Mother    • Hypertension Father    • Diabetes Father    • Cancer Maternal Grandmother    • Cancer Maternal Grandfather    • Cancer Paternal Grandmother    • Cancer Paternal  Grandfather        Social History     Socioeconomic History   • Marital status: Single     Spouse name: Not on file   • Number of children: Not on file   • Years of education: Not on file   • Highest education level: Not on file   Tobacco Use   • Smoking status: Never Smoker   • Smokeless tobacco: Never Used   • Tobacco comment: CAFFEINE USE   Substance and Sexual Activity   • Alcohol use: Yes     Comment: RARE   • Drug use: No       Allergies   Allergen Reactions   • Gluten Meal GI Intolerance         Current Outpatient Medications:   •  Multiple Vitamin (MULTI-DAY PO), Take  by mouth., Disp: , Rfl:   •  Probiotic Product (PROBIOTIC DAILY PO), Take  by mouth., Disp: , Rfl:      Review of Systems   Constitutional: Negative for appetite change, fatigue and fever.   Eyes: Negative for visual disturbance.   Respiratory: Positive for shortness of breath.    Cardiovascular: Positive for palpitations. Negative for leg swelling.   Gastrointestinal: Negative for abdominal pain and vomiting.   Endocrine: Negative for polydipsia and polyuria.   Musculoskeletal: Negative for joint swelling and neck pain.   Skin: Negative for rash.   Neurological: Positive for weakness. Negative for numbness.   Psychiatric/Behavioral: Negative for behavioral problems.     I have reviewed the ROS as documented by the MA; Sherry Murcia MD.      Objective:    There were no vitals taken for this visit.    Physical Exam  General appearance - no distress  Eyes-PERRLA, anicteric sclera  Ear nose and throat-external ears and nose normal.  Noted midline trachea.  Respiratory-normal chest on inspection.  No respiratory distress noted.  Musculoskeletal-no edema.  Hammer toes noted.  Skin-no rashes.  Neuro-alert and oriented x3        Results Review:    I reviewed the patient's new clinical results.    Office Visit on 02/27/2020   Component Date Value Ref Range Status   • Glucose 02/27/2020 120* 65 - 99 mg/dL Final   • BUN 02/27/2020 13  6 - 20 mg/dL  Final   • Creatinine 02/27/2020 0.52* 0.57 - 1.00 mg/dL Final   • eGFR Non African Am 02/27/2020 130  >60 mL/min/1.73 Final   • eGFR African Am 02/27/2020 >150  >60 mL/min/1.73 Final   • BUN/Creatinine Ratio 02/27/2020 25.0  7.0 - 25.0 Final   • Sodium 02/27/2020 136  136 - 145 mmol/L Final   • Potassium 02/27/2020 4.6  3.5 - 5.2 mmol/L Final   • Chloride 02/27/2020 101  98 - 107 mmol/L Final   • Total CO2 02/27/2020 23.0  22.0 - 29.0 mmol/L Final   • Calcium 02/27/2020 9.8  8.6 - 10.5 mg/dL Final   • Total Protein 02/27/2020 7.3  6.0 - 8.5 g/dL Final   • Albumin 02/27/2020 4.30  3.50 - 5.20 g/dL Final   • Globulin 02/27/2020 3.0  gm/dL Final   • A/G Ratio 02/27/2020 1.4  g/dL Final   • Total Bilirubin 02/27/2020 0.3  0.2 - 1.2 mg/dL Final   • Alkaline Phosphatase 02/27/2020 94  39 - 117 U/L Final   • AST (SGOT) 02/27/2020 20  1 - 32 U/L Final   • ALT (SGPT) 02/27/2020 39* 1 - 33 U/L Final   • TSH 02/27/2020 <0.005* 0.270 - 4.200 uIU/mL Final   • Free T4 02/27/2020 3.27* 0.93 - 1.70 ng/dL Final    Results may be falsely increased if patient taking Biotin.   • T3, Free 02/27/2020 9.5* 2.0 - 4.4 pg/mL Final   • WBC 02/27/2020 7.54  3.40 - 10.80 10*3/mm3 Final   • RBC 02/27/2020 4.78  3.77 - 5.28 10*6/mm3 Final   • Hemoglobin 02/27/2020 13.3  12.0 - 15.9 g/dL Final   • Hematocrit 02/27/2020 40.1  34.0 - 46.6 % Final   • MCV 02/27/2020 83.9  79.0 - 97.0 fL Final   • MCH 02/27/2020 27.8  26.6 - 33.0 pg Final   • MCHC 02/27/2020 33.2  31.5 - 35.7 g/dL Final   • RDW 02/27/2020 12.0* 12.3 - 15.4 % Final   • Platelets 02/27/2020 283  140 - 450 10*3/mm3 Final       Ahjung was seen today for hyperthyroidism.    Diagnoses and all orders for this visit:    Hyperthyroidism  -     TSH; Future  -     T4, Free; Future  -     T3, Free; Future  -     Thyroid Stimulating Immunoglobulin; Future  -     Thyroid Peroxidase Antibody; Future  -     TSH; Future  -     T4, Free; Future  -     Vitamin B12 & Folate; Future  -     Vitamin D 25  "Hydroxy; Future  -     Hemoglobin A1c; Future      Hypothyroidism  Levels are worse  Repeat thyroid panel on Monday  If patient has resolution of overactive thyroid and is now underactive would consider levothyroxine  Given her clinical scenario suspect this is Hashimoto's thyroiditis.    However will check TSI and TPO antibody levels to rule this out.    Check A1c    Check vitamin D and B12 levels    Reviewed the thyroid ultrasound.    25   ( greater than 50% of the time) out of  45 minutes face-to-face spent counseling the patient on performing the lab tests at [facility], [place] and further treatment plan.        Thank you for asking me to see your patient, Gabriela Be in consultation.         Sherry Murcia MD  05/01/20    EMR Dragon / transcription disclaimer:     \"Dictated utilizing Dragon dictation\".        "

## 2020-05-04 ENCOUNTER — RESULTS ENCOUNTER (OUTPATIENT)
Dept: ENDOCRINOLOGY | Age: 40
End: 2020-05-04

## 2020-05-04 DIAGNOSIS — E05.90 HYPERTHYROIDISM: ICD-10-CM

## 2020-05-07 NOTE — PROGRESS NOTES
S/w pt regarding follow up with Dr Murcia. Pt states she will wait for lab results before scheduling her next appt with Dr Murcia.

## 2020-07-30 ENCOUNTER — RESULTS ENCOUNTER (OUTPATIENT)
Dept: ENDOCRINOLOGY | Age: 40
End: 2020-07-30

## 2020-07-30 DIAGNOSIS — E05.90 HYPERTHYROIDISM: ICD-10-CM

## 2021-04-02 ENCOUNTER — BULK ORDERING (OUTPATIENT)
Dept: CASE MANAGEMENT | Facility: OTHER | Age: 41
End: 2021-04-02

## 2021-04-02 DIAGNOSIS — Z23 IMMUNIZATION DUE: ICD-10-CM

## 2021-07-08 LAB
25(OH)D3+25(OH)D2 SERPL-MCNC: 32 NG/ML (ref 30–100)
FOLATE SERPL-MCNC: 5.9 NG/ML
HBA1C MFR BLD: 5.6 % (ref 4.8–5.6)
T4 FREE SERPL-MCNC: 1.53 NG/DL (ref 0.82–1.77)
VIT B12 SERPL-MCNC: 714 PG/ML (ref 232–1245)

## 2022-04-27 ENCOUNTER — OFFICE VISIT (OUTPATIENT)
Dept: FAMILY MEDICINE CLINIC | Facility: CLINIC | Age: 42
End: 2022-04-27

## 2022-04-27 VITALS
DIASTOLIC BLOOD PRESSURE: 108 MMHG | SYSTOLIC BLOOD PRESSURE: 148 MMHG | OXYGEN SATURATION: 98 % | WEIGHT: 184 LBS | HEIGHT: 64 IN | RESPIRATION RATE: 14 BRPM | BODY MASS INDEX: 31.41 KG/M2 | HEART RATE: 66 BPM

## 2022-04-27 DIAGNOSIS — R03.0 ELEVATED BLOOD PRESSURE READING: ICD-10-CM

## 2022-04-27 DIAGNOSIS — Z00.00 PHYSICAL EXAM: ICD-10-CM

## 2022-04-27 DIAGNOSIS — Z12.31 ENCOUNTER FOR SCREENING MAMMOGRAM FOR MALIGNANT NEOPLASM OF BREAST: ICD-10-CM

## 2022-04-27 DIAGNOSIS — E03.9 HYPOTHYROIDISM, UNSPECIFIED TYPE: Primary | ICD-10-CM

## 2022-04-27 PROCEDURE — 99396 PREV VISIT EST AGE 40-64: CPT | Performed by: NURSE PRACTITIONER

## 2022-04-27 NOTE — PROGRESS NOTES
Subjective   Gabriela Be is a 41 y.o. female.   PMHX:thyroid issues and is requesting another referral to Dr Hatch  PSHX:None  PFHX:Father has diabetes and hypertension  Exercise:Not routine  Diet:Well balanced  Sleep hygiene:good sleep   Employment status:[employer].  Has headaches but she thinks it is due to her menstrual cycle    History of Present Illness   See above  The following portions of the patient's history were reviewed and updated as appropriate: allergies, current medications, past family history, past medical history, past social history, past surgical history and problem list.    Review of Systems   Constitutional: Negative for activity change and appetite change.   HENT: Positive for congestion.    Respiratory: Negative for cough and shortness of breath.    Cardiovascular: Negative for chest pain and palpitations.   Neurological: Negative for dizziness, light-headedness and headache.       Objective   Physical Exam  Vitals and nursing note reviewed.   Constitutional:       General: She is not in acute distress.     Appearance: She is well-developed.   HENT:      Head: Normocephalic and atraumatic.      Right Ear: External ear normal.      Left Ear: External ear normal.   Neck:      Thyroid: No thyromegaly.   Cardiovascular:      Rate and Rhythm: Normal rate and regular rhythm.      Heart sounds: Normal heart sounds.   Pulmonary:      Effort: Pulmonary effort is normal.      Breath sounds: Normal breath sounds.   Musculoskeletal:         General: Normal range of motion.      Cervical back: Neck supple.   Skin:     General: Skin is warm.   Neurological:      Mental Status: She is alert.       Repeat /98    Assessment/Plan   Diagnoses and all orders for this visit:    1. Hypothyroidism, unspecified type (Primary)  -     Ambulatory Referral to Endocrinology  -     TSH    2. Encounter for screening mammogram for malignant neoplasm of breast  -     Mammo Screening Bilateral With CAD;  Future    3. Physical exam  -     Comprehensive Metabolic Panel  -     Lipid Panel With LDL / HDL Ratio    4. Elevated blood pressure reading        Preventative counseling for diet and exercise with patient at visit.  Pt reports that they wear their seatbelt regularly.   She is going to keep a BP diary and MyChart the readings to me in 2 weeks.  Will MyChart her lab results

## 2022-04-27 NOTE — PATIENT INSTRUCTIONS
Preventive Care 40-64 Years Old, Female  Preventive care refers to lifestyle choices and visits with your health care provider that can promote health and wellness. This includes:  A yearly physical exam. This is also called an annual wellness visit.  Regular dental and eye exams.  Immunizations.  Screening for certain conditions.  Healthy lifestyle choices, such as:  Eating a healthy diet.  Getting regular exercise.  Not using drugs or products that contain nicotine and tobacco.  Limiting alcohol use.  What can I expect for my preventive care visit?  Physical exam  Your health care provider will check your:  Height and weight. These may be used to calculate your BMI (body mass index). BMI is a measurement that tells if you are at a healthy weight.  Heart rate and blood pressure.  Body temperature.  Skin for abnormal spots.  Counseling  Your health care provider may ask you questions about your:  Past medical problems.  Family's medical history.  Alcohol, tobacco, and drug use.  Emotional well-being.  Home life and relationship well-being.  Sexual activity.  Diet, exercise, and sleep habits.  Work and work environment.  Access to firearms.  Method of birth control.  Menstrual cycle.  Pregnancy history.  What immunizations do I need?    Vaccines are usually given at various ages, according to a schedule. Your health care provider will recommend vaccines for you based on your age, medical history, and lifestyle or other factors, such as travel or where you work.  What tests do I need?  Blood tests  Lipid and cholesterol levels. These may be checked every 5 years, or more often if you are over 50 years old.  Hepatitis C test.  Hepatitis B test.  Screening  Lung cancer screening. You may have this screening every year starting at age 55 if you have a 30-pack-year history of smoking and currently smoke or have quit within the past 15 years.  Colorectal cancer screening.  All adults should have this screening starting at  age 50 and continuing until age 75.  Your health care provider may recommend screening at age 45 if you are at increased risk.  You will have tests every 1-10 years, depending on your results and the type of screening test.  Diabetes screening.  This is done by checking your blood sugar (glucose) after you have not eaten for a while (fasting).  You may have this done every 1-3 years.  Mammogram.  This may be done every 1-2 years.  Talk with your health care provider about when you should start having regular mammograms. This may depend on whether you have a family history of breast cancer.  BRCA-related cancer screening. This may be done if you have a family history of breast, ovarian, tubal, or peritoneal cancers.  Pelvic exam and Pap test.  This may be done every 3 years starting at age 21.  Starting at age 30, this may be done every 5 years if you have a Pap test in combination with an HPV test.  Other tests  STD (sexually transmitted disease) testing, if you are at risk.  Bone density scan. This is done to screen for osteoporosis. You may have this scan if you are at high risk for osteoporosis.  Talk with your health care provider about your test results, treatment options, and if necessary, the need for more tests.  Follow these instructions at home:  Eating and drinking    Eat a diet that includes fresh fruits and vegetables, whole grains, lean protein, and low-fat dairy products.  Take vitamin and mineral supplements as recommended by your health care provider.  Do not drink alcohol if:  Your health care provider tells you not to drink.  You are pregnant, may be pregnant, or are planning to become pregnant.  If you drink alcohol:  Limit how much you have to 0-1 drink a day.  Be aware of how much alcohol is in your drink. In the [place] one drink equals one 12 oz bottle of beer (355 mL), one 5 oz glass of wine (148 mL), or one 1½ oz glass of hard liquor (44 mL).    Lifestyle  Take daily care of your teeth and  gums. Brush your teeth every morning and night with fluoride toothpaste. Floss one time each day.  Stay active. Exercise for at least 30 minutes 5 or more days each week.  Do not use any products that contain nicotine or tobacco, such as cigarettes, e-cigarettes, and chewing tobacco. If you need help quitting, ask your health care provider.  Do not use drugs.  If you are sexually active, practice safe sex. Use a condom or other form of protection to prevent STIs (sexually transmitted infections).  If you do not wish to become pregnant, use a form of birth control. If you plan to become pregnant, see your health care provider for a prepregnancy visit.  If told by your health care provider, take low-dose aspirin daily starting at age 50.  Find healthy ways to cope with stress, such as:  Meditation, yoga, or listening to music.  Journaling.  Talking to a trusted person.  Spending time with friends and family.  Safety  Always wear your seat belt while driving or riding in a vehicle.  Do not drive:  If you have been drinking alcohol. Do not ride with someone who has been drinking.  When you are tired or distracted.  While texting.  Wear a helmet and other protective equipment during sports activities.  If you have firearms in your house, make sure you follow all gun safety procedures.  What's next?  Visit your health care provider once a year for an annual wellness visit.  Ask your health care provider how often you should have your eyes and teeth checked.  Stay up to date on all vaccines.  This information is not intended to replace advice given to you by your health care provider. Make sure you discuss any questions you have with your health care provider.  Document Revised: 09/21/2021 Document Reviewed: 08/29/2019  [employer] Patient Education © 2021 [employer]

## 2022-04-28 LAB
ALBUMIN SERPL-MCNC: 4.3 G/DL (ref 3.8–4.8)
ALBUMIN/GLOB SERPL: 1.3 {RATIO} (ref 1.2–2.2)
ALP SERPL-CCNC: 63 IU/L (ref 44–121)
ALT SERPL-CCNC: 18 IU/L (ref 0–32)
AST SERPL-CCNC: 18 IU/L (ref 0–40)
BILIRUB SERPL-MCNC: 0.4 MG/DL (ref 0–1.2)
BUN SERPL-MCNC: 11 MG/DL (ref 6–24)
BUN/CREAT SERPL: 16 (ref 9–23)
CALCIUM SERPL-MCNC: 9.5 MG/DL (ref 8.7–10.2)
CHLORIDE SERPL-SCNC: 104 MMOL/L (ref 96–106)
CHOLEST SERPL-MCNC: 214 MG/DL (ref 100–199)
CO2 SERPL-SCNC: 21 MMOL/L (ref 20–29)
CREAT SERPL-MCNC: 0.7 MG/DL (ref 0.57–1)
EGFRCR SERPLBLD CKD-EPI 2021: 110 ML/MIN/1.73
GLOBULIN SER CALC-MCNC: 3.2 G/DL (ref 1.5–4.5)
GLUCOSE SERPL-MCNC: 102 MG/DL (ref 65–99)
HDLC SERPL-MCNC: 60 MG/DL
LDLC SERPL CALC-MCNC: 132 MG/DL (ref 0–99)
LDLC/HDLC SERPL: 2.2 RATIO (ref 0–3.2)
POTASSIUM SERPL-SCNC: 4.6 MMOL/L (ref 3.5–5.2)
PROT SERPL-MCNC: 7.5 G/DL (ref 6–8.5)
SODIUM SERPL-SCNC: 140 MMOL/L (ref 134–144)
TRIGL SERPL-MCNC: 126 MG/DL (ref 0–149)
TSH SERPL DL<=0.005 MIU/L-ACNC: 2.48 UIU/ML (ref 0.45–4.5)
VLDLC SERPL CALC-MCNC: 22 MG/DL (ref 5–40)

## 2022-04-29 ENCOUNTER — TRANSCRIBE ORDERS (OUTPATIENT)
Dept: ADMINISTRATIVE | Facility: HOSPITAL | Age: 42
End: 2022-04-29

## 2022-04-29 DIAGNOSIS — Z12.31 SCREENING MAMMOGRAM FOR HIGH-RISK PATIENT: Primary | ICD-10-CM

## 2022-05-20 ENCOUNTER — HOSPITAL ENCOUNTER (OUTPATIENT)
Dept: MAMMOGRAPHY | Facility: HOSPITAL | Age: 42
Discharge: HOME | End: 2022-05-20
Admitting: NURSE PRACTITIONER

## 2022-05-20 DIAGNOSIS — Z12.31 SCREENING MAMMOGRAM FOR HIGH-RISK PATIENT: ICD-10-CM

## 2022-05-20 PROCEDURE — 77067 SCR MAMMO BI INCL CAD: CPT

## 2022-05-20 PROCEDURE — 77063 BREAST TOMOSYNTHESIS BI: CPT

## 2022-05-23 DIAGNOSIS — R92.8 ABNORMAL MAMMOGRAM OF RIGHT BREAST: Primary | ICD-10-CM

## 2022-06-24 ENCOUNTER — HOSPITAL ENCOUNTER (OUTPATIENT)
Dept: MAMMOGRAPHY | Facility: HOSPITAL | Age: 42
Discharge: HOME | End: 2022-06-24

## 2022-06-24 ENCOUNTER — HOSPITAL ENCOUNTER (OUTPATIENT)
Dept: ULTRASOUND IMAGING | Facility: HOSPITAL | Age: 42
Discharge: HOME | End: 2022-06-24

## 2022-06-24 DIAGNOSIS — R92.8 ABNORMAL MAMMOGRAM OF RIGHT BREAST: ICD-10-CM

## 2022-06-24 PROCEDURE — 77065 DX MAMMO INCL CAD UNI: CPT

## 2022-06-24 PROCEDURE — G0279 TOMOSYNTHESIS, MAMMO: HCPCS

## 2022-06-24 PROCEDURE — 76642 ULTRASOUND BREAST LIMITED: CPT
